# Patient Record
Sex: FEMALE | URBAN - METROPOLITAN AREA
[De-identification: names, ages, dates, MRNs, and addresses within clinical notes are randomized per-mention and may not be internally consistent; named-entity substitution may affect disease eponyms.]

---

## 2023-03-08 ENCOUNTER — APPOINTMENT (RX ONLY)
Dept: URBAN - METROPOLITAN AREA CLINIC 101 | Facility: CLINIC | Age: 28
Setting detail: DERMATOLOGY
End: 2023-03-08

## 2023-03-08 DIAGNOSIS — Z41.9 ENCOUNTER FOR PROCEDURE FOR PURPOSES OTHER THAN REMEDYING HEALTH STATE, UNSPECIFIED: ICD-10-CM

## 2023-03-08 PROCEDURE — ? PRE-OP WORKLIST

## 2023-03-08 PROCEDURE — ? ORDER TESTS

## 2023-03-08 NOTE — PROCEDURE: PRE-OP WORKLIST
Date Of Surgery: 5/8/2023
Detail Level: Simple
Surgery Scheduled: Explant, Lift \\nRemote sent 3/8/2023
[facility]
Surgeon: Sergo
Admission Status: outpatient
Estimated Length Of Stay: 1-2 Hours
Coordination With:: Jeannie
Photos Taken?: no